# Patient Record
Sex: MALE | Race: WHITE | NOT HISPANIC OR LATINO
[De-identification: names, ages, dates, MRNs, and addresses within clinical notes are randomized per-mention and may not be internally consistent; named-entity substitution may affect disease eponyms.]

---

## 2022-06-14 ENCOUNTER — APPOINTMENT (OUTPATIENT)
Dept: VASCULAR SURGERY | Facility: CLINIC | Age: 72
End: 2022-06-14
Payer: MEDICARE

## 2022-06-14 VITALS
BODY MASS INDEX: 26.22 KG/M2 | WEIGHT: 173 LBS | HEIGHT: 68 IN | DIASTOLIC BLOOD PRESSURE: 83 MMHG | HEART RATE: 76 BPM | SYSTOLIC BLOOD PRESSURE: 119 MMHG

## 2022-06-14 DIAGNOSIS — Z87.891 PERSONAL HISTORY OF NICOTINE DEPENDENCE: ICD-10-CM

## 2022-06-14 PROCEDURE — 93970 EXTREMITY STUDY: CPT

## 2022-06-14 PROCEDURE — 99204 OFFICE O/P NEW MOD 45 MIN: CPT

## 2022-06-15 PROBLEM — Z87.891 FORMER SMOKER: Status: ACTIVE | Noted: 2022-06-14

## 2022-06-15 RX ORDER — FUROSEMIDE 40 MG/1
40 TABLET ORAL
Refills: 0 | Status: ACTIVE | COMMUNITY

## 2022-06-15 RX ORDER — FENTANYL 75 UG/H
75 PATCH, EXTENDED RELEASE TRANSDERMAL
Refills: 0 | Status: ACTIVE | COMMUNITY

## 2022-06-15 RX ORDER — CLOPIDOGREL 75 MG/1
75 TABLET, FILM COATED ORAL
Refills: 0 | Status: ACTIVE | COMMUNITY

## 2022-06-15 RX ORDER — FUROSEMIDE 80 MG/1
80 TABLET ORAL
Refills: 0 | Status: ACTIVE | COMMUNITY

## 2022-06-15 RX ORDER — RIVAROXABAN 20 MG/1
20 TABLET, FILM COATED ORAL
Refills: 0 | Status: ACTIVE | COMMUNITY

## 2022-06-15 RX ORDER — LEVETIRACETAM 750 MG/1
750 TABLET, FILM COATED ORAL
Refills: 0 | Status: ACTIVE | COMMUNITY

## 2022-06-15 RX ORDER — ROSUVASTATIN CALCIUM 10 MG/1
10 TABLET, FILM COATED ORAL
Refills: 0 | Status: ACTIVE | COMMUNITY

## 2022-06-15 RX ORDER — METOPROLOL SUCCINATE 50 MG/1
50 TABLET, EXTENDED RELEASE ORAL
Refills: 0 | Status: ACTIVE | COMMUNITY

## 2022-06-15 RX ORDER — OXYCODONE HYDROCHLORIDE 30 MG/1
30 TABLET ORAL
Refills: 0 | Status: ACTIVE | COMMUNITY

## 2022-06-20 NOTE — ADDENDUM
[FreeTextEntry1] : This note was written by Jonathan Lambert, acting as a scribe for Dr. Ebenezer Rodriguez.  I, Dr. Ebenezer Rodriguez, have read and attest that all the information, medical decision-making, and discharge instructions within are true and accurate.\par \par I, Dr. Ebenezer Rodriguez, personally performed the evaluation and management (E/M) services for this new patient.  That E/M includes conducting the initial examination, assessing all conditions, and establishing the plan of care.  Today, my ACP, Jonathan Lambert, was here to observe my evaluation and management services for this patient to be followed going forward.

## 2022-06-20 NOTE — ASSESSMENT
[FreeTextEntry1] : 71yoM w/PMHx of HLD, previous RLE DVT s/p thrombectomy decades prior, previous smoking hx, presents for evaluation of his LEs in the setting of one episode of R lateral ankle bleeding varix 3mos prior, and persistent light yellow drainage from his L pretibia.  Pt was seen in 2015 in this office for similar symptoms, recommended he begin daily compression stocking use.  Pt admits to a single episode of varicose vein rupture on 03/08/2022 which required direct pressure and ER admission for hemostasis.  Pt also reports thin, yellow drainage from a previous L pretibial trauma site which stopped spontaneously.  Pt's legs are chronically edematous, but improves w/elevation, rest, and compression.  He is active and walks >1mi w/only cane support.\par \par Healed L pretibial ulcer noted w/no drainage or erythema, R lateral ankle bleeding varicose vein now healed w/o evidence of SVT.  Skin healthy, no evidence of new ulceration elsewhere.  Bilateral venous duplex to evaluate for reflux reveals no reflux/DVT/SVT b/l, L GSV not visualized, varicose veins noted in both calves.  Encouraged pt to continue to wear compression daily (new Rx for comfort cotton compression stockings provided to pt), and BID moisturizer to skin.  Pt will RTO if symptoms recur, as we may be able to sclerose and bleeding veins.

## 2022-06-20 NOTE — PROCEDURE
[FreeTextEntry1] : b/l venous duplex to evaluate for reflux reveals no reflux/DVT/SVT b/l, L GSV not visualized, varicose veins noted in both calves

## 2022-06-20 NOTE — REASON FOR VISIT
ambulatory [Consultation] : a consultation visit [Friend] : friend [FreeTextEntry1] : L pretibial stasis ulcer w/recent drainage, R lateral ankle bleeding varix

## 2022-06-20 NOTE — PHYSICAL EXAM
[Normal Thyroid] : the thyroid was normal [Normal Breath Sounds] : Normal breath sounds [Respiratory Effort] : normal respiratory effort [Normal Heart Sounds] : normal heart sounds [Normal Rate and Rhythm] : normal rate and rhythm [2+] : left 2+ [Ankle Swelling (On Exam)] : present [Ankle Swelling Bilaterally] : bilaterally  [Varicose Veins Of Lower Extremities] : bilaterally [Ankle Swelling On The Right] : mild [] : bilaterally [Ankle Swelling On The Left] : moderate [No Rash or Lesion] : No rash or lesion [Alert] : alert [Calm] : calm [JVD] : no jugular venous distention  [Carotid Bruits] : no carotid bruits [Right Carotid Bruit] : no bruit heard over the right carotid [Left Carotid Bruit] : no bruit heard over the left carotid [Abdomen Masses] : No abdominal masses [Abdomen Tenderness] : ~T ~M No abdominal tenderness [Purpura] : no purpura  [Petechiae] : no petechiae [Skin Ulcer] : no ulcer [Skin Induration] : no induration [de-identified] : Well-nourished, NAD, ambulates w/cane support [de-identified] : NC/AT, anicteric [de-identified] : FROM throughout, strength 5/5x4, no palpable cords [de-identified] : Healed R lateral ankle ulcer w/soft veins underlying the site; healed L pretibial scar w/no drainage/erythema

## 2022-06-20 NOTE — HISTORY OF PRESENT ILLNESS
[FreeTextEntry1] : 71yoM w/PMHx of HLD, previous RLE DVT s/p thrombectomy decades prior, previous smoking hx, presents for evaluation of his LEs in the setting of one episode of R lateral ankle bleeding varix 3mos prior, and persistent light yellow drainage from his L pretibia.  Pt was seen in 2015 in this office for similar symptoms, recommended he begin daily compression stocking use.  Pt admits to a single episode of varicose vein rupture on 03/08/2022 which required direct pressure and ER admission for hemostasis.  Pt also reports thin, yellow drainage from a previous L pretibial trauma site which stopped spontaneously.  Pt's legs are chronically edematous, but improves w/elevation, rest, and compression.  He is active and walks >1mi w/only cane support.\par \par Pt denies fevers/chills/malaise.

## 2022-11-08 ENCOUNTER — APPOINTMENT (OUTPATIENT)
Dept: VASCULAR SURGERY | Facility: CLINIC | Age: 72
End: 2022-11-08

## 2022-11-08 VITALS
BODY MASS INDEX: 26.22 KG/M2 | HEIGHT: 68 IN | HEART RATE: 85 BPM | SYSTOLIC BLOOD PRESSURE: 108 MMHG | WEIGHT: 173 LBS | DIASTOLIC BLOOD PRESSURE: 69 MMHG

## 2022-11-08 PROCEDURE — 99213 OFFICE O/P EST LOW 20 MIN: CPT

## 2022-11-09 RX ORDER — LINACLOTIDE 145 UG/1
145 CAPSULE, GELATIN COATED ORAL
Qty: 30 | Refills: 0 | Status: ACTIVE | COMMUNITY
Start: 2022-10-07

## 2022-11-09 RX ORDER — LEVETIRACETAM 500 MG/1
500 TABLET, FILM COATED ORAL
Qty: 60 | Refills: 0 | Status: ACTIVE | COMMUNITY
Start: 2022-07-06

## 2022-11-09 RX ORDER — RAMELTEON 8 MG/1
8 TABLET ORAL
Qty: 90 | Refills: 0 | Status: ACTIVE | COMMUNITY
Start: 2022-10-07

## 2022-11-09 RX ORDER — METHYLNALTREXONE BROMIDE 150 MG/1
150 TABLET ORAL
Qty: 90 | Refills: 0 | Status: ACTIVE | COMMUNITY
Start: 2022-08-26

## 2022-11-09 RX ORDER — TIZANIDINE 2 MG/1
2 TABLET ORAL
Qty: 90 | Refills: 0 | Status: ACTIVE | COMMUNITY
Start: 2022-07-05

## 2022-11-09 RX ORDER — LEVOTHYROXINE SODIUM 0.03 MG/1
25 TABLET ORAL
Qty: 90 | Refills: 0 | Status: ACTIVE | COMMUNITY
Start: 2021-12-02

## 2022-11-09 RX ORDER — GLUCAGON INJECTION, SOLUTION 1 MG/.2ML
1 INJECTION, SOLUTION SUBCUTANEOUS
Qty: 1 | Refills: 0 | Status: ACTIVE | COMMUNITY
Start: 2022-06-10

## 2022-11-09 RX ORDER — CICLOPIROX 7.7 MG/G
0.77 GEL TOPICAL
Qty: 100 | Refills: 0 | Status: ACTIVE | COMMUNITY
Start: 2022-10-07

## 2022-11-09 RX ORDER — LEVALBUTEROL TARTRATE 45 UG/1
45 AEROSOL, METERED ORAL
Qty: 15 | Refills: 0 | Status: ACTIVE | COMMUNITY
Start: 2022-08-08

## 2022-11-09 RX ORDER — SACUBITRIL AND VALSARTAN 24; 26 MG/1; MG/1
24-26 TABLET, FILM COATED ORAL
Qty: 180 | Refills: 0 | Status: ACTIVE | COMMUNITY
Start: 2022-10-07

## 2022-11-09 RX ORDER — INSULIN LISPRO 100 [IU]/ML
100 INJECTION, SOLUTION INTRAVENOUS; SUBCUTANEOUS
Qty: 60 | Refills: 0 | Status: ACTIVE | COMMUNITY
Start: 2021-12-07

## 2022-11-09 RX ORDER — AMIODARONE HYDROCHLORIDE 200 MG/1
200 TABLET ORAL
Qty: 60 | Refills: 0 | Status: ACTIVE | COMMUNITY
Start: 2022-05-20

## 2022-11-09 RX ORDER — FAMOTIDINE 20 MG/1
20 TABLET, FILM COATED ORAL
Qty: 90 | Refills: 0 | Status: ACTIVE | COMMUNITY
Start: 2022-01-14

## 2022-11-09 RX ORDER — HYDROCORTISONE 1 %
12 CREAM (GRAM) TOPICAL
Qty: 400 | Refills: 0 | Status: ACTIVE | COMMUNITY
Start: 2022-10-07

## 2022-11-09 RX ORDER — PANCRELIPASE LIPASE, PANCRELIPASE PROTEASE, PANCRELIPASE AMYLASE 40000; 126000; 168000 [USP'U]/1; [USP'U]/1; [USP'U]/1
40000-126000 CAPSULE, DELAYED RELEASE ORAL
Qty: 90 | Refills: 0 | Status: ACTIVE | COMMUNITY
Start: 2022-07-12

## 2022-11-09 RX ORDER — UMECLIDINIUM 62.5 UG/1
62.5 AEROSOL, POWDER ORAL
Qty: 30 | Refills: 0 | Status: ACTIVE | COMMUNITY
Start: 2022-04-15

## 2022-11-09 RX ORDER — NITROGLYCERIN 0.4 MG/1
0.4 TABLET SUBLINGUAL
Qty: 100 | Refills: 0 | Status: ACTIVE | COMMUNITY
Start: 2022-06-10

## 2022-11-09 RX ORDER — TRIAMCINOLONE ACETONIDE 1 MG/G
0.1 OINTMENT TOPICAL
Qty: 80 | Refills: 0 | Status: ACTIVE | COMMUNITY
Start: 2022-10-07

## 2022-11-09 RX ORDER — MONTELUKAST 10 MG/1
10 TABLET, FILM COATED ORAL
Qty: 90 | Refills: 0 | Status: ACTIVE | COMMUNITY
Start: 2022-08-08

## 2022-11-09 RX ORDER — FLUTICASONE PROPIONATE 50 UG/1
50 SPRAY, METERED NASAL
Qty: 16 | Refills: 0 | Status: ACTIVE | COMMUNITY
Start: 2022-08-08

## 2022-11-10 ENCOUNTER — APPOINTMENT (OUTPATIENT)
Dept: VASCULAR SURGERY | Facility: CLINIC | Age: 72
End: 2022-11-10

## 2022-11-10 PROCEDURE — 36470 NJX SCLRSNT 1 INCMPTNT VEIN: CPT

## 2022-11-10 NOTE — ADDENDUM
[FreeTextEntry1] : This note was written by Jonathan Lambert, acting as a scribe for Dr. Ebenezer Rodriguez.  I, Dr. Ebenezer Rodriguez, have read and attest that all the information, medical decision-making, and discharge instructions within are true and accurate.\par \par I, Dr. Ebenezer Rodriguez, personally performed the evaluation and management (E/M) services for this established patient who presents today with (an) existing condition(s).  That E/M includes conducting the examination, assessing all conditions, and (re)establishing/reinforcing a plan of care.  Today, my ACP, Jonathan Lambert, was here to observe my evaluation and management services for this condition to be followed going forward.

## 2022-11-10 NOTE — ASSESSMENT
[FreeTextEntry1] : 72yoM w/PMHx of HLD, previous RLE DVT s/p thrombectomy decades prior, previous smoking hx, presents for evaluation of his LEs in the setting of a new episode of R lateral ankle bleeding varix 4d prior.  Bleeding stopped w/leg elevation and tourniquet use, but pt nervous that he will bleed again.  He denies redness/pain/drainage at the site currently, and has been compliant w/compression stockings.\par \par Healed L pretibial ulcer noted w/no drainage or erythema, R lateral ankle bleeding varicose vein now healed w/o evidence of SVT.  Skin healthy, no evidence of new ulceration elsewhere.  In light of recurrent ruptured varix, will plan to sclerose the varicose veins at pt's R lateral ankle this week in office.

## 2022-11-10 NOTE — PHYSICAL EXAM
[Normal Thyroid] : the thyroid was normal [Normal Breath Sounds] : Normal breath sounds [Respiratory Effort] : normal respiratory effort [Normal Heart Sounds] : normal heart sounds [Normal Rate and Rhythm] : normal rate and rhythm [2+] : left 2+ [Ankle Swelling (On Exam)] : present [Ankle Swelling Bilaterally] : bilaterally  [Varicose Veins Of Lower Extremities] : bilaterally [Ankle Swelling On The Right] : mild [] : bilaterally [Ankle Swelling On The Left] : moderate [No Rash or Lesion] : No rash or lesion [Alert] : alert [Calm] : calm [JVD] : no jugular venous distention  [Carotid Bruits] : no carotid bruits [Right Carotid Bruit] : no bruit heard over the right carotid [Left Carotid Bruit] : no bruit heard over the left carotid [Abdomen Masses] : No abdominal masses [Abdomen Tenderness] : ~T ~M No abdominal tenderness [Purpura] : no purpura  [Petechiae] : no petechiae [Skin Ulcer] : no ulcer [Skin Induration] : no induration [de-identified] : Well-nourished, NAD, ambulates w/cane support [de-identified] : NC/AT, anicteric [de-identified] : Healed R lateral ankle ulcer w/soft veins underlying the site; healed L pretibial scar w/no drainage/erythema [de-identified] : FROM throughout, strength 5/5x4, no palpable cords

## 2022-11-10 NOTE — HISTORY OF PRESENT ILLNESS
[FreeTextEntry1] : 72yoM w/PMHx of HLD, previous RLE DVT s/p thrombectomy decades prior, previous smoking hx, presents for evaluation of his LEs in the setting of a new episode of R lateral ankle bleeding varix 4d prior.  Bleeding stopped w/leg elevation and tourniquet use, but pt nervous that he will bleed again.  He denies redness/pain/drainage at the site currently, and has been compliant w/compression stockings.

## 2022-11-17 ENCOUNTER — APPOINTMENT (OUTPATIENT)
Dept: VASCULAR SURGERY | Facility: CLINIC | Age: 72
End: 2022-11-17

## 2022-11-17 VITALS
SYSTOLIC BLOOD PRESSURE: 110 MMHG | HEART RATE: 89 BPM | HEIGHT: 68 IN | DIASTOLIC BLOOD PRESSURE: 71 MMHG | WEIGHT: 173 LBS | BODY MASS INDEX: 26.22 KG/M2

## 2022-11-17 PROCEDURE — 93971 EXTREMITY STUDY: CPT

## 2022-11-17 PROCEDURE — 99213 OFFICE O/P EST LOW 20 MIN: CPT

## 2022-11-17 RX ORDER — FUROSEMIDE 20 MG/1
20 TABLET ORAL
Qty: 180 | Refills: 0 | Status: ACTIVE | COMMUNITY
Start: 2022-05-17

## 2022-11-17 RX ORDER — CIPROFLOXACIN HYDROCHLORIDE 500 MG/1
500 TABLET, FILM COATED ORAL
Qty: 14 | Refills: 0 | Status: ACTIVE | COMMUNITY
Start: 2022-11-17 | End: 1900-01-01

## 2022-11-17 NOTE — PROCEDURE
[FreeTextEntry1] : RLE venous duplex performed to r/o DVT as cause for edema demonstrates significant edema in the R ankle/foot, no DVT/SVT, large R groin lymph noted.  R achilles tendon wound dressed w/xerform/bulky dry gauze.

## 2022-11-17 NOTE — HISTORY OF PRESENT ILLNESS
[FreeTextEntry1] : 72yoM w/PMHx of HLD, previous RLE DVT s/p thrombectomy decades prior, previous smoking hx, returns after sclerotherapy of a bleeding varix at the R lateral ankle 2wks prior.  Pt reports moderate pain in the foot w/swelling at the ankle and foot since his injection.  Pt unable to fully weight bear on the R foot w/o pain, and reports breakdown of the skin overlying the achilles tendon due to tape on the dressing.  Pt denies fevers/chills.

## 2022-11-17 NOTE — PHYSICAL EXAM
[JVD] : no jugular venous distention  [Normal Thyroid] : the thyroid was normal [Normal Breath Sounds] : Normal breath sounds [Carotid Bruits] : no carotid bruits [Respiratory Effort] : normal respiratory effort [Normal Heart Sounds] : normal heart sounds [Normal Rate and Rhythm] : normal rate and rhythm [Right Carotid Bruit] : no bruit heard over the right carotid [Left Carotid Bruit] : no bruit heard over the left carotid [2+] : left 2+ [Ankle Swelling (On Exam)] : present [Ankle Swelling Bilaterally] : bilaterally  [Varicose Veins Of Lower Extremities] : bilaterally [Ankle Swelling On The Right] : mild [] : bilaterally [Ankle Swelling On The Left] : moderate [Abdomen Masses] : No abdominal masses [Abdomen Tenderness] : ~T ~M No abdominal tenderness [No Rash or Lesion] : No rash or lesion [Purpura] : no purpura  [Petechiae] : no petechiae [Skin Ulcer] : no ulcer [Skin Induration] : no induration [Alert] : alert [Calm] : calm [de-identified] : Well-nourished, NAD, ambulates w/cane support [de-identified] : NC/AT, anicteric [FreeTextEntry1] : +cords at the R lateral foot [de-identified] : FROM throughout, strength 5/5x4, no palpable cords [de-identified] : Healed R lateral ankle ulcer w/palpable cords at the injection sites, no erythema; maceration of the skin overlying the R achilles tendon

## 2022-11-17 NOTE — ASSESSMENT
[FreeTextEntry1] : 72yoM w/PMHx of HLD, previous RLE DVT s/p thrombectomy decades prior, previous smoking hx, returns after sclerotherapy of a bleeding varix at the R lateral ankle 2wks prior.  Pt reports moderate pain in the foot w/swelling at the ankle and foot since his injection.  Pt unable to fully weight bear on the R foot w/o pain, and reports breakdown of the skin overlying the achilles tendon due to tape on the dressing.  Pt denies fevers/chills.\par \par Healed L pretibial ulcer noted w/no drainage or erythema, palpable cords noted at the site of the previous sclerotherapy, no redness or tenderness at the site, but swelling noted in the R medial ankle and foot.  Wound dressed w/xeroform/dry gauze.  Due to pain in the foot and swelling in the ankle/foot, in addition to the LAD in the R groin on duplex today, will prescribe cipro x 1wk for pt to treat possible cellulitis.  Pt will continue BID moisturizer to the skin, and dress the achilles wound daily w/bacitracin/dry gauze and elevate RLE.  Will contact pt by phone next week to assess for improvement.

## 2022-11-23 ENCOUNTER — APPOINTMENT (OUTPATIENT)
Dept: VASCULAR SURGERY | Facility: CLINIC | Age: 72
End: 2022-11-23

## 2022-11-23 DIAGNOSIS — I83.891 VARICOSE VEINS OF RIGHT LOWER EXTREMITY WITH OTHER COMPLICATIONS: ICD-10-CM

## 2022-11-23 PROCEDURE — 99213 OFFICE O/P EST LOW 20 MIN: CPT

## 2022-11-23 NOTE — ADDENDUM
[FreeTextEntry1] : This note was written by Jonathan Lambert, acting as a scribe for Dr. Ebenezer Rodriguez.  I, Dr. Ebenezer Rodriguez, have read and attest that all the information, medical decision-making, and discharge instructions within are true and accurate.\par \par I, Dr. Ebenezer Rodriguez, personally performed the evaluation and management (E/M) services for this established patient who presents today with (an) existing condition(s).  That E/M includes conducting the examination, assessing all conditions, and (re)establishing/reinforcing a plan of care.  Today, my ACP, Jonathan Lambert, was here to observe my evaluation and management services for this condition to be followed going forward.
Patent

## 2022-11-23 NOTE — HISTORY OF PRESENT ILLNESS
[FreeTextEntry1] : 72yoM w/PMHx of HLD, previous RLE DVT s/p thrombectomy decades prior, previous smoking hx, returns for sclerotherapy of a previously bleeding varix of the R ankle.  Discussed options, and risks and benefits of the procedure w/the pt, and he agrees w/the current plan of treatment.

## 2022-11-23 NOTE — PROCEDURE
Patient continued to recover well. Vitals remained stable. Puncture site clean and dry with no bleeding or bruising present. Discharge instructions reviewed with the patient and she verbalized understanding of instruction. Patient had lunch and is dressed waiting for her spouse at this time. [FreeTextEntry1] : R lateral ankle cleansed w/chlorhexidine and draped, and Athoxysclerol solution mixed 50/50% w/normal saline injected into bleeding varix and surrounding veins.  Direct pressure was kept on all injected veins to achieve complete hemostasis, and injection sites were dressed w/bulky cotton balls and ACE bandage.  Instructions were provided to keep dressing in place for 1d and remove at home.

## 2022-11-23 NOTE — PHYSICAL EXAM
[Normal Thyroid] : the thyroid was normal [Normal Breath Sounds] : Normal breath sounds [Respiratory Effort] : normal respiratory effort [Normal Heart Sounds] : normal heart sounds [Normal Rate and Rhythm] : normal rate and rhythm [2+] : left 2+ [Ankle Swelling (On Exam)] : present [Ankle Swelling Bilaterally] : bilaterally  [Varicose Veins Of Lower Extremities] : bilaterally [Ankle Swelling On The Right] : mild [] : bilaterally [Ankle Swelling On The Left] : moderate [No Rash or Lesion] : No rash or lesion [Alert] : alert [Calm] : calm [JVD] : no jugular venous distention  [Carotid Bruits] : no carotid bruits [Right Carotid Bruit] : no bruit heard over the right carotid [Left Carotid Bruit] : no bruit heard over the left carotid [Abdomen Masses] : No abdominal masses [Abdomen Tenderness] : ~T ~M No abdominal tenderness [Purpura] : no purpura  [Petechiae] : no petechiae [Skin Ulcer] : no ulcer [Skin Induration] : no induration [de-identified] : Well-nourished, NAD, ambulates w/cane support [de-identified] : NC/AT, anicteric [FreeTextEntry1] : +cords at the R lateral foot [de-identified] : FROM throughout, strength 5/5x4, no palpable cords [de-identified] : Healed R lateral ankle ulcer w/palpable cords at the injection sites, no erythema; maceration of the skin overlying the R achilles tendon

## 2022-11-23 NOTE — ASSESSMENT
[FreeTextEntry1] : 72yoM w/PMHx of HLD, previous RLE DVT s/p thrombectomy decades prior, previous smoking hx, returns for sclerotherapy of a previously bleeding varix of the R ankle.  Discussed options, and risks and benefits of the procedure w/the pt, and he agrees w/the current plan of treatment.\par \par Bleeding veins and surrounding veins were identified and injected w/50/50% solution of athoxysclerol/normal saline until fully exsanguinated.  Sites were dressed w/pressure dressings using cotton balls and ACE and pt instructed to remove dressing in 1d and replace ACE bandage daily after showering.  He will RTO in 1wk for reevaluation of the site.

## 2022-11-28 PROBLEM — I83.891 HEMORRHAGE OF VARICOSE VEINS OF RIGHT LOWER EXTREMITY: Status: ACTIVE | Noted: 2022-06-15

## 2022-11-28 RX ORDER — SILVER SULFADIAZINE 10 MG/G
1 CREAM TOPICAL
Qty: 1 | Refills: 1 | Status: ACTIVE | COMMUNITY
Start: 2022-11-28 | End: 1900-01-01

## 2022-11-28 NOTE — HISTORY OF PRESENT ILLNESS
[FreeTextEntry1] : 72yoM w/PMHx of HLD, previous RLE DVT s/p thrombectomy decades prior, previous smoking hx, returns after sclerotherapy of a bleeding varix at the R lateral ankle 2wks prior.  Pt reports significantly improved pain in the foot w/improved swelling at the ankle and foot since his injection.  Pt still unable to fully weight bear on the R foot w/o pain, and reports breakdown of the skin overlying the achilles tendon due to tape on the dressing, but healing steadily w/topical wound care.  Pt denies fevers/chills.

## 2022-11-28 NOTE — ASSESSMENT
[FreeTextEntry1] : 72yoM w/PMHx of HLD, previous RLE DVT s/p thrombectomy decades prior, previous smoking hx, returns after sclerotherapy of a bleeding varix at the R lateral ankle 2wks prior.  Pt reports moderate pain in the foot w/swelling at the ankle and foot since his injection.  Pt unable to fully weight bear on the R foot w/o pain, and reports breakdown of the skin overlying the achilles tendon due to tape on the dressing.  Pt denies fevers/chills.\par \par Healed L pretibial ulcer noted w/no drainage or erythema, palpable cords noted at the site of the previous sclerotherapy, no redness or tenderness at the site, but swelling noted in the R medial ankle and foot.  Wound at the achilles tendon dressed w/silvadene/dry gauze, recommend pt continue dressing daily.  He will continue to elevate leg when not in use, and RTO in a few weeks to reevaluate the posterior calf wound.

## 2022-11-28 NOTE — PHYSICAL EXAM
[JVD] : no jugular venous distention  [Normal Thyroid] : the thyroid was normal [Carotid Bruits] : no carotid bruits [Normal Breath Sounds] : Normal breath sounds [Respiratory Effort] : normal respiratory effort [Normal Heart Sounds] : normal heart sounds [Normal Rate and Rhythm] : normal rate and rhythm [Right Carotid Bruit] : no bruit heard over the right carotid [Left Carotid Bruit] : no bruit heard over the left carotid [2+] : left 2+ [Ankle Swelling (On Exam)] : present [Ankle Swelling Bilaterally] : bilaterally  [Varicose Veins Of Lower Extremities] : bilaterally [Ankle Swelling On The Right] : mild [] : bilaterally [Ankle Swelling On The Left] : moderate [Abdomen Masses] : No abdominal masses [Abdomen Tenderness] : ~T ~M No abdominal tenderness [No Rash or Lesion] : No rash or lesion [Purpura] : no purpura  [Petechiae] : no petechiae [Skin Ulcer] : no ulcer [Skin Induration] : no induration [Alert] : alert [Calm] : calm [de-identified] : Well-nourished, NAD, ambulates w/cane support [de-identified] : NC/AT, anicteric [FreeTextEntry1] : +cords at the R lateral foot [de-identified] : FROM throughout, strength 5/5x4, no palpable cords [de-identified] : Healed R lateral ankle ulcer w/palpable cords at the injection sites, no erythema; maceration of the skin overlying the R achilles tendon, 100% granulation

## 2022-12-02 ENCOUNTER — APPOINTMENT (OUTPATIENT)
Dept: VASCULAR SURGERY | Facility: CLINIC | Age: 72
End: 2022-12-02

## 2022-12-02 DIAGNOSIS — I83.028 VARICOSE VEINS OF LEFT LOWER EXTREMITY WITH ULCER OTHER PART OF LOWER LEG: ICD-10-CM

## 2022-12-02 DIAGNOSIS — L97.821 VARICOSE VEINS OF LEFT LOWER EXTREMITY WITH ULCER OTHER PART OF LOWER LEG: ICD-10-CM

## 2022-12-02 PROCEDURE — 99213 OFFICE O/P EST LOW 20 MIN: CPT

## 2022-12-02 NOTE — PHYSICAL EXAM
[JVD] : no jugular venous distention  [Normal Thyroid] : the thyroid was normal [Carotid Bruits] : no carotid bruits [Normal Breath Sounds] : Normal breath sounds [Respiratory Effort] : normal respiratory effort [Normal Heart Sounds] : normal heart sounds [Normal Rate and Rhythm] : normal rate and rhythm [Right Carotid Bruit] : no bruit heard over the right carotid [Left Carotid Bruit] : no bruit heard over the left carotid [2+] : left 2+ [Ankle Swelling (On Exam)] : present [Ankle Swelling Bilaterally] : bilaterally  [Varicose Veins Of Lower Extremities] : bilaterally [Ankle Swelling On The Right] : mild [] : bilaterally [Ankle Swelling On The Left] : moderate [Abdomen Masses] : No abdominal masses [Abdomen Tenderness] : ~T ~M No abdominal tenderness [No Rash or Lesion] : No rash or lesion [Purpura] : no purpura  [Petechiae] : no petechiae [Skin Ulcer] : no ulcer [Skin Induration] : no induration [Alert] : alert [Calm] : calm [de-identified] : Well-nourished, NAD, ambulates w/cane support [de-identified] : NC/AT, anicteric [FreeTextEntry1] : soft veins in the L medial/lateral foot and ankle [de-identified] : FROM throughout, strength 5/5x4, no palpable cords [de-identified] : Healed R lateral ankle ulcer w/no residual palpable cords at the injection sites, no erythema; callus overlying the R achilles tendon but ulcer fully healed at this time

## 2022-12-02 NOTE — ASSESSMENT
[FreeTextEntry1] : 72yoM w/PMHx of HLD, previous RLE DVT s/p thrombectomy decades prior, previous smoking hx, returns after sclerotherapy of a bleeding varix at the R lateral ankle 2wks prior.  Pt reports significantly improved pain in the foot w/improved swelling at the ankle and foot since his injection.  Pt still unable to fully weight bear on the R foot w/o pain, and reports that his previous skin breakdown overlying the achilles tendon is significantly less painful and is nearly closed.\par \par Healed L pretibial and achilles ulcers noted.  R achilles tendon callus removed at bedside and dressed w/bacitracin, band-aid, dry gauze; LLE moisturized w/Vit A&D ointment.  Instructed pt to continue to moisturize the entire LLE w/vaseline or similar ointment, and wrap ankle w/gauze until skin overlying the achilles tendon thickens, but continue daily compression stocking use.  Pt will RTO PRN.

## 2022-12-02 NOTE — PROCEDURE
[FreeTextEntry1] : R achilles tendon callus removed at bedside and dressed w/bacitracin, band-aid, dry gauze; LLE moisturized w/Vit A&D ointment

## 2022-12-02 NOTE — HISTORY OF PRESENT ILLNESS
[FreeTextEntry1] : 72yoM w/PMHx of HLD, previous RLE DVT s/p thrombectomy decades prior, previous smoking hx, returns after sclerotherapy of a bleeding varix at the R lateral ankle 2wks prior.  Pt reports significantly improved pain in the foot w/improved swelling at the ankle and foot since his injection.  Pt still unable to fully weight bear on the R foot w/o pain, and reports that his previous skin breakdown overlying the achilles tendon is significantly less painful and is nearly closed.